# Patient Record
Sex: MALE | Race: OTHER
[De-identification: names, ages, dates, MRNs, and addresses within clinical notes are randomized per-mention and may not be internally consistent; named-entity substitution may affect disease eponyms.]

---

## 2017-07-31 ENCOUNTER — HOSPITAL ENCOUNTER (OUTPATIENT)
Dept: HOSPITAL 53 - M LAB | Age: 37
End: 2017-07-31
Attending: NURSE PRACTITIONER
Payer: OTHER GOVERNMENT

## 2017-07-31 DIAGNOSIS — Z01.818: Primary | ICD-10-CM

## 2017-07-31 DIAGNOSIS — N47.8: ICD-10-CM

## 2017-07-31 LAB
ANION GAP SERPL CALC-SCNC: 5 MEQ/L (ref 8–16)
BUN SERPL-MCNC: 8 MG/DL (ref 7–18)
CALCIUM SERPL-MCNC: 9.4 MG/DL (ref 8.5–10.1)
CHLORIDE SERPL-SCNC: 104 MEQ/L (ref 98–107)
CO2 SERPL-SCNC: 30 MEQ/L (ref 21–32)
CREAT SERPL-MCNC: 1.11 MG/DL (ref 0.7–1.3)
ERYTHROCYTE [DISTWIDTH] IN BLOOD BY AUTOMATED COUNT: 12.2 % (ref 11.5–14.5)
GFR SERPL CREATININE-BSD FRML MDRD: > 60 ML/MIN/{1.73_M2} (ref 60–?)
GLUCOSE SERPL-MCNC: 91 MG/DL (ref 70–105)
INR PPP: 1.06
MCH RBC QN AUTO: 31.5 PG (ref 27–33)
MCHC RBC AUTO-ENTMCNC: 33.3 G/DL (ref 32–36.5)
MCV RBC AUTO: 94.5 FL (ref 80–96)
PLATELET # BLD AUTO: 238 K/MM3 (ref 150–450)
POTASSIUM SERPL-SCNC: 4.2 MEQ/L (ref 3.5–5.1)
SODIUM SERPL-SCNC: 139 MEQ/L (ref 136–145)
WBC # BLD AUTO: 8.9 K/MM3 (ref 4–10)

## 2017-08-02 ENCOUNTER — HOSPITAL ENCOUNTER (OUTPATIENT)
Dept: HOSPITAL 53 - M SDC | Age: 37
Discharge: HOME | End: 2017-08-02
Attending: UROLOGY
Payer: OTHER GOVERNMENT

## 2017-08-02 VITALS — HEIGHT: 67 IN | BODY MASS INDEX: 27 KG/M2 | WEIGHT: 172 LBS

## 2017-08-02 VITALS — DIASTOLIC BLOOD PRESSURE: 66 MMHG | SYSTOLIC BLOOD PRESSURE: 124 MMHG

## 2017-08-02 DIAGNOSIS — N47.1: Primary | ICD-10-CM

## 2017-08-02 DIAGNOSIS — Z87.891: ICD-10-CM

## 2017-08-02 PROCEDURE — 88304 TISSUE EXAM BY PATHOLOGIST: CPT

## 2017-08-02 PROCEDURE — 54161 CIRCUM 28 DAYS OR OLDER: CPT

## 2017-08-03 NOTE — RO
DATE OF PROCEDURE:  08/02/2017

 

PREPROCEDURE DIAGNOSIS:  Phimosis.

 

POSTPROCEDURE DIAGNOSIS:  Phimosis.

 

PROCEDURE:  Circumcision.

 

SURGEON:  Emir Bell MD

 

ASSISTANT:  None.

 

ANESTHESIA:  General.

 

COMPLICATIONS:  None.

 

ESTIMATED BLOOD LOSS:  N/A.

 

HISTORY OF THE PRESENT ILLNESS:  A 37-year-old male patient who has phimosis and

recurrent balanitis. For this reason, he has consented for a circumcision.

 

DESCRIPTION OF PROCEDURE:  In a patient under general anesthesia in supine

position, after prepping and draping the area of concern, which included the

entire genitalia and abdomen, we started by doing a penile block with 1%

lidocaine and Marcaine 0.25%, a total of 10 mL around the base of the penis. We

then proceeded to do an incision 1 cm away from the sulcus of the glans in

circumferential fashion, and then 3 cm proximal to the base of the shaft, we

actually did another circumferential incision with a cold knife, a Bistoury blade

and a #15 blade. We then excised the skin between both incisions and fulgurated

bleeding vessels with electro Bovie cautery. We approximated both skin edges with

a catgut chromic #3-0 in separate stitches. We then placed Bacitracin cream,

wrapped the penis with a 4 x 4 and Coban.

 

PLAN: The patient will go home today with antibiotic and pain medications,

followup at [facility] in about 3 weeks. He cannot have sexual

intercourse for 1 month.

## 2022-12-14 ENCOUNTER — HOSPITAL ENCOUNTER (EMERGENCY)
Dept: HOSPITAL 53 - M ED | Age: 42
Discharge: HOME | End: 2022-12-14
Payer: OTHER GOVERNMENT

## 2022-12-14 VITALS — DIASTOLIC BLOOD PRESSURE: 98 MMHG | SYSTOLIC BLOOD PRESSURE: 128 MMHG

## 2022-12-14 VITALS — BODY MASS INDEX: 26.4 KG/M2 | HEIGHT: 68 IN | WEIGHT: 174.17 LBS

## 2022-12-14 DIAGNOSIS — R76.11: Primary | ICD-10-CM

## 2022-12-14 DIAGNOSIS — F17.200: ICD-10-CM
